# Patient Record
(demographics unavailable — no encounter records)

---

## 2024-12-23 NOTE — HISTORY OF PRESENT ILLNESS
[Born at ___ Wks Gestation] : The patient was born at [unfilled] weeks gestation [C/S] : via  section [[place]] : at [facility] [BW: _____] : weight of [unfilled] [Length: _____] : length of [unfilled] [DW: _____] : Discharge weight was [unfilled] [Hepatitis B Vaccine Given] : Hepatitis B vaccine given [Nirsevimab Given] : Nirsevimab given  [Expressed Breast milk ___oz/feed] : [unfilled] oz of expressed breast milk per feed [Formula ___ oz/feed] : [unfilled] oz of formula per feed [Hours between feeds ___] : Child is fed every [unfilled] hours [Normal] : Normal [Frequency of stools: ___] : Frequency of stools: [unfilled]  stools [Yellow] : yellow [Seedy] : seedy [AMA] : AMA [Other: ____] : [unfilled] [Age: ___] : [unfilled] year old mother [C/S Indication: ____] : ( [unfilled] ) [(1) _____] : [unfilled] [(5) _____] : [unfilled] [None] : There were no delivery complications [MBT: ____] : MBT - [unfilled] [Has a racial, or ethnic risk of G6PD deficiency (, , Mediterranean, or  ancestry)] : Has a racial, or ethnic risk of G6PD deficiency (, , Mediterranean, or  ancestry)  [Requires G6PD quantitative test] : Requires G6PD quantitative test [In Bassinet/Crib] : sleeps in bassinet/crib [On back] : sleeps on back [No] : No cigarette smoke exposure [Rear facing car seat in back seat] : Rear facing car seat in back seat [Carbon Monoxide Detectors] : Carbon monoxide detectors at home [Smoke Detectors] : Smoke detectors at home. [NO] : No [RSV vaccine] : RSV vaccine not received by mother at least 14 days prior to delivery [HepBsAG] : HepBsAg negative [HIV] : HIV negative [GBS] : GBS negative [Rubella (Immune)] : Rubella not immune [VDRL/RPR (Reactive)] : VDRL/RPR nonreactive [] : negative [Presents with hemolytic anemia] : Does not present with hemolytic anemia  [Presents with hemolytic jaundice] : Does not present with hemolytic jaundice  [Presents with early onset increasing  jaundice persisting beyond the first week of life (bilirubin level greater than the 40th percentile] : Does not present with early onset increasing  jaundice persisting beyond the first week of life (bilirubin level greater than the 40th percentile for age in hours)   [Is admitted to the hospital for jaundice following discharge] : Is not admitted to the hospital for jaundice following discharge   [FreeTextEntry9] : A+ [FreeTextEntry8] : PARENTS REPORT NORMAL FETAL ECHO [Co-sleeping] : no co-sleeping [Loose bedding, pillow, toys, and/or bumpers in crib] : no loose bedding, pillow, toys, and/or bumpers in crib [Exposure to electronic nicotine delivery system] : No exposure to electronic nicotine delivery system [FreeTextEntry7] : MOTHER (JULIETA, STAY AT HOME MOM, 39Y) FATHER (JÚNIOR, /SELF EMPLOYEED, 36Y)  [de-identified] : NASAL CONGESTION [de-identified] : SIMILAC TOTAL 360 75%/ BREAST 25% [de-identified] : ADVISED AGAINST CO-SLEEPING WITH TWIN

## 2024-12-23 NOTE — PLAN
[TextEntry] : WILL REACH OUT TO CARDIO GIVEN HARSHNESS OF [facility] TO OBTAIN EVAL IN NEXT FEW DAYS WEIGHT/COLOR CHECK IN 1 WEEK HIP SONO AT 6 WEEKS RX FOR G6PD ([place] PERFORM [place] [facility] [place] 6 WEEKS[place]  ADDENDUM: SPOKE WITH DR. MILLER (CARDIO), HE WILL SEE BABY TOMORROW 12/24 AT 10AM

## 2024-12-23 NOTE — PHYSICAL EXAM
[Alert] : alert [Normocephalic] : normocephalic [Flat Open Anterior [place]] : flat open anterior fontanelle [Icteric sclera] : icteric sclera [PERRL] : PERRL [Red Reflex Bilateral] : red reflex bilateral [Normally Placed Ears] : normally placed ears [Auricles Well Formed] : auricles well formed [Clear Tympanic membranes] : clear tympanic membranes [Light reflex present] : light reflex present [Bony structures visible] : bony structures visible [Patent Auditory Canal] : patent auditory canal [Nares Patent] : nares patent [Palate Intact] : palate intact [Uvula Midline] : uvula midline [Supple, full passive range of motion] : supple, full passive range of motion [Symmetric Chest Rise] : symmetric chest rise [Clear to Auscultation Bilaterally] : clear to auscultation bilaterally [Normoactive Precordium] : no normoactive precordium [Regular Rate and Rhythm] : regular rate and rhythm [S1, S2 present] : S1, S2 present [+2 Femoral Pulses] : +2 femoral pulses [Soft] : soft [Bowel Sounds] : bowel sounds present [Umbilical Stump Dry, Clean, Intact] : umbilical stump dry, clean, intact [Normal external genitalia] : normal external genitalia [Patent Vagina] : patent vagina [Patent] : patent [Normally Placed] : normally placed [No Abnormal Lymph Nodes Palpated] : no abnormal lymph nodes palpated [Symmetric Flexed Extremities] : symmetric flexed extremities [Startle Reflex] : startle reflex present [Suck Reflex] : suck reflex present [Rooting] : rooting reflex present [Palmar Grasp] : palmar grasp present [Plantar Grasp] : plantar reflex present [Symmetric [place]] : symmetric [place] [Jaundice] : jaundice [Acute Distress] : no acute distress [Discharge] : no discharge [Palpable Masses] : no palpable masses [Tender] : nontender [Distended] : not distended [Hepatomegaly] : no hepatomegaly [Splenomegaly] : no splenomegaly [Clitoromegaly] : no clitoromegaly [Hurtado-Ortolani] : negative Hurtado-Ortolani [Spinal Dimple] : no spinal dimple [Tuft of Hair] : no tuft of hair [FreeTextEntry8] : HARSH CONTINUOUS MACHINERY LIKE MURMUR LSB [de-identified] : JAUNDICE TO ABDOMEN

## 2024-12-23 NOTE — HISTORY OF PRESENT ILLNESS
[Born at ___ Wks Gestation] : The patient was born at [unfilled] weeks gestation [C/S] : via  section [[place]] : at [facility] [BW: _____] : weight of [unfilled] [Length: _____] : length of [unfilled] [DW: _____] : Discharge weight was [unfilled] [Hepatitis B Vaccine Given] : Hepatitis B vaccine given [Nirsevimab Given] : Nirsevimab given  [Expressed Breast milk ___oz/feed] : [unfilled] oz of expressed breast milk per feed [Formula ___ oz/feed] : [unfilled] oz of formula per feed [Hours between feeds ___] : Child is fed every [unfilled] hours [Normal] : Normal [Frequency of stools: ___] : Frequency of stools: [unfilled]  stools [Yellow] : yellow [Seedy] : seedy [AMA] : AMA [Other: ____] : [unfilled] [Age: ___] : [unfilled] year old mother [C/S Indication: ____] : ( [unfilled] ) [(1) _____] : [unfilled] [(5) _____] : [unfilled] [None] : There were no delivery complications [MBT: ____] : MBT - [unfilled] [Has a racial, or ethnic risk of G6PD deficiency (, , Mediterranean, or  ancestry)] : Has a racial, or ethnic risk of G6PD deficiency (, , Mediterranean, or  ancestry)  [Requires G6PD quantitative test] : Requires G6PD quantitative test [In Bassinet/Crib] : sleeps in bassinet/crib [On back] : sleeps on back [No] : No cigarette smoke exposure [Rear facing car seat in back seat] : Rear facing car seat in back seat [Carbon Monoxide Detectors] : Carbon monoxide detectors at home [Smoke Detectors] : Smoke detectors at home. [NO] : No [RSV vaccine] : RSV vaccine not received by mother at least 14 days prior to delivery [HepBsAG] : HepBsAg negative [HIV] : HIV negative [GBS] : GBS negative [Rubella (Immune)] : Rubella not immune [VDRL/RPR (Reactive)] : VDRL/RPR nonreactive [] : negative [Presents with hemolytic anemia] : Does not present with hemolytic anemia  [Presents with hemolytic jaundice] : Does not present with hemolytic jaundice  [Presents with early onset increasing  jaundice persisting beyond the first week of life (bilirubin level greater than the 40th percentile] : Does not present with early onset increasing  jaundice persisting beyond the first week of life (bilirubin level greater than the 40th percentile for age in hours)   [Is admitted to the hospital for jaundice following discharge] : Is not admitted to the hospital for jaundice following discharge   [FreeTextEntry9] : A+ [FreeTextEntry8] : PARENTS REPORT NORMAL FETAL ECHO [Co-sleeping] : no co-sleeping [Loose bedding, pillow, toys, and/or bumpers in crib] : no loose bedding, pillow, toys, and/or bumpers in crib [Exposure to electronic nicotine delivery system] : No exposure to electronic nicotine delivery system [FreeTextEntry7] : MOTHER (JULIETA, STAY AT HOME MOM, 39Y) FATHER (JÚNIOR, /SELF EMPLOYEED, 36Y)  [de-identified] : NASAL CONGESTION [de-identified] : SIMILAC TOTAL 360 75%/ BREAST 25% [de-identified] : ADVISED AGAINST CO-SLEEPING WITH TWIN

## 2024-12-23 NOTE — PHYSICAL EXAM
[Alert] : alert [Normocephalic] : normocephalic [Flat Open Anterior [place]] : flat open anterior fontanelle [Icteric sclera] : icteric sclera [PERRL] : PERRL [Red Reflex Bilateral] : red reflex bilateral [Normally Placed Ears] : normally placed ears [Auricles Well Formed] : auricles well formed [Clear Tympanic membranes] : clear tympanic membranes [Light reflex present] : light reflex present [Bony structures visible] : bony structures visible [Patent Auditory Canal] : patent auditory canal [Nares Patent] : nares patent [Palate Intact] : palate intact [Uvula Midline] : uvula midline [Supple, full passive range of motion] : supple, full passive range of motion [Symmetric Chest Rise] : symmetric chest rise [Clear to Auscultation Bilaterally] : clear to auscultation bilaterally [Normoactive Precordium] : no normoactive precordium [Regular Rate and Rhythm] : regular rate and rhythm [S1, S2 present] : S1, S2 present [+2 Femoral Pulses] : +2 femoral pulses [Soft] : soft [Bowel Sounds] : bowel sounds present [Umbilical Stump Dry, Clean, Intact] : umbilical stump dry, clean, intact [Normal external genitalia] : normal external genitalia [Patent Vagina] : patent vagina [Patent] : patent [Normally Placed] : normally placed [No Abnormal Lymph Nodes Palpated] : no abnormal lymph nodes palpated [Symmetric Flexed Extremities] : symmetric flexed extremities [Startle Reflex] : startle reflex present [Suck Reflex] : suck reflex present [Rooting] : rooting reflex present [Palmar Grasp] : palmar grasp present [Plantar Grasp] : plantar reflex present [Symmetric [place]] : symmetric [place] [Jaundice] : jaundice [Acute Distress] : no acute distress [Discharge] : no discharge [Palpable Masses] : no palpable masses [Tender] : nontender [Distended] : not distended [Hepatomegaly] : no hepatomegaly [Splenomegaly] : no splenomegaly [Clitoromegaly] : no clitoromegaly [Hurtado-Ortolani] : negative Hurtado-Ortolani [Spinal Dimple] : no spinal dimple [Tuft of Hair] : no tuft of hair [FreeTextEntry8] : HARSH CONTINUOUS MACHINERY LIKE MURMUR LSB [de-identified] : JAUNDICE TO ABDOMEN

## 2024-12-23 NOTE — PLAN
[TextEntry] : WILL REACH OUT TO CARDIO GIVEN HARSHNESS OF [facility] TO OBTAIN EVAL IN NEXT FEW DAYS WEIGHT/COLOR CHECK IN 1 WEEK HIP SONO AT 6 WEEKS RX FOR G6PD (MAY PERFORM WHEN GO TO [facility] FOR HIP SONO AT 6 WEEKS)  ADDENDUM: SPOKE WITH DR. IMLLER (CARDIO), HE WILL SEE BABY TOMORROW 12/24 AT 10AM

## 2024-12-23 NOTE — DISCUSSION/SUMMARY
[Normal Growth] : growth [Normal Development] : developmental [No Elimination Concerns] : elimination [Continue Regimen] : feeding [No Skin Concerns] : skin [Normal Sleep Pattern] : sleep [None] : no known medical problems [Anticipatory Guidance Given] : Anticipatory guidance addressed as per the history of present illness section [ Transition] :  transition [ Care] :  care [Nutritional Adequacy] : nutritional adequacy [Parental Well-Being] : parental well-being [Safety] : safety [Hepatitis B In Hospital] : Hepatitis B administered while in the hospital [No Vaccines] : no vaccines needed [No Medications] : ~He/She~ is not on any medications [Parent/Guardian] : Parent/Guardian [FreeTextEntry1] :  When in car, patient should be in rear-facing car seat in back seat. Air dry umbillical stump. Put baby to sleep on back, in own crib with no loose or soft bedding. Limit baby's exposure to others, especially those with fever or unknown vaccine status. CALL ASAP FOR RED, WHITE/GREY OR BLACK STOOLS TO ER FOR RECTAL TEMP 100.4 OR MORE AT AGE LESS THAN 60 DOL

## 2024-12-24 NOTE — REVIEW OF SYSTEMS
[Nl] : no feeding issues at this time. [___ Formula] : [unfilled] Formula  [___ ounces/feeding] : ~TEODORA rizvi/feeding [___ Times/day] : [unfilled] times/day [Acting Fussy] : not acting ~L fussy [Fever] : no fever [Wgt Loss (___ Lbs)] : no recent weight loss [Pallor] : not pale [Discharge] : no discharge [Redness] : no redness [Nasal Discharge] : no nasal discharge [Nasal Stuffiness] : no nasal congestion [Stridor] : no stridor [Cyanosis] : no cyanosis [Edema] : no edema [Diaphoresis] : not diaphoretic [Tachypnea] : not tachypneic [Wheezing] : no wheezing [Cough] : no cough [Being A Poor Eater] : not a poor eater [Vomiting] : no vomiting [Diarrhea] : no diarrhea [Decrease In Appetite] : appetite not decreased [Fainting (Syncope)] : no fainting [Dec Consciousness] :  no decrease in consciousness [Seizure] : no seizures [Hypotonicity (Flaccid)] : not hypotonic [Refusal to Bear Wgt] : normal weight bearing [Puffy Hands/Feet] : no hand/feet puffiness [Rash] : no rash [Hemangioma] : no hemangioma [Jaundice] : no jaundice [Wound problems] : no wound problems [Bruising] : no tendency for easy bruising [Swollen Glands] : no lymphadenopathy [Enlarged [place]] : the fontanelle was not enlarged [Hoarse Cry] : no hoarse cry [Failure To Thrive] : no failure to thrive [Vaginal Discharge] : no vaginal discharge [Ambiguous Genitals] : genitals not ambiguous [Dec Urine Output] : no oliguria [Solid Foods] : No solid food at this time

## 2024-12-24 NOTE — PHYSICAL EXAM
[General Appearance - Alert] : alert [General Appearance - In No Acute Distress] : in no acute distress [General Appearance - Well Nourished] : well nourished [General Appearance - Well Developed] : well developed [General Appearance - Well-Appearing] : well appearing [Appearance Of Head] : the head was normocephalic [Facies] : there were no dysmorphic facial features [Sclera] : the conjunctiva were normal [Outer Ear] : the ears and nose were normal in appearance [Examination Of The Oral Cavity] : mucous membranes were moist and pink [Auscultation Breath Sounds / Voice Sounds] : breath sounds clear to auscultation bilaterally [Apical Impulse] : quiet precordium with normal apical impulse [Normal Chest Appearance] : the chest was normal in appearance [Heart Rate And Rhythm] : normal heart rate and rhythm [Heart Sounds] : normal S1 and S2 [Heart Sounds Gallop] : no gallops [Heart Sounds Pericardial Friction Rub] : no pericardial rub [Edema] : no edema [Arterial Pulses] : normal upper and lower extremity pulses with no pulse delay [Heart Sounds Click] : no clicks [Capillary Refill Test] : normal capillary refill [Systolic] : systolic [II] : a grade 2/6 [LUSB] : LUSB [Ejection] : ejection [Harsh] : harsh [Abdomen Soft] : soft [Nondistended] : nondistended [Abdomen Tenderness] : non-tender [Nail Clubbing] : no clubbing  or cyanosis of the fingers [Motor Tone] : normal muscle strength and tone [] : no rash [Skin Lesions] : no lesions [Skin Turgor] : normal turgor [Demonstrated Behavior - Infant Nonreactive To Parents] : interactive [Mood] : mood and affect were appropriate for age [Demonstrated Behavior] : normal behavior

## 2024-12-24 NOTE — HISTORY OF PRESENT ILLNESS
[FreeTextEntry1] : NBA is a 7 day old twin female who presents for cardiac evaluation of a murmur that was heard yesterday. Her pregnancy and  period were unremarkable. NBA has been asymptomatic from a cardiac standpoint and has been growing and developing appropriately without tachypnea, cyanosis, irritability, or diaphoresis with feeds.

## 2024-12-24 NOTE — CONSULT LETTER
[Today's Date] : [unfilled] [Name] : Name: [unfilled] [] : : ~~ [Today's Date:] : [unfilled] [Dear  ___:] : Dear Dr. [unfilled]: [Consult] : I had the pleasure of evaluating your patient, [unfilled]. My full evaluation follows. [Consult - Single Provider] : Thank you very much for allowing me to participate in the care of this patient. If you have any questions, please do not hesitate to contact me. [Sincerely,] : Sincerely, [FreeTextEntry4] : DR. DIPTI WRIGHT MD [de-identified] : Annie Doyle MD, FAAP, FACC  Pediatric Cardiologist  of Pediatrics [facility]

## 2024-12-24 NOTE — PAST MEDICAL HISTORY
[At ___ Weeks Gestation] : at [unfilled] weeks gestation [ Section] : by  section [None] : No maternal complications [FreeTextEntry2] : twin pregnancy fetal echo performed [FreeTextEntry1] : no complications

## 2024-12-24 NOTE — CARDIOLOGY SUMMARY
[Today's Date] : [unfilled] [FreeTextEntry1] : Normal sinus rhythm without preexcitation or ectopy. Heart rate (bpm): 150 [FreeTextEntry2] : 1. Patent foramen ovale versus small secundum ASD, with left to right flow across the interatrial septum. 2. Moderate to large, non-restrictive, membranous ventricular septal defect, with left to right systolic interventricular shunt. 3. 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 4. Trivial aortic valve regurgitation. 5. Acceleration of branch PA flow velocities up to \R\2 m/s secondary to PPS coupled with the significant left to right shunt at the atrial and ventricular levels. 6. Normal left ventricular size, morphology and systolic function. 7. Normal right ventricular morphology with qualitatively normal size and systolic function. 8. No pericardial effusion.

## 2025-01-01 NOTE — PLAN
[TextEntry] : F/U WITH CARDIO IN 2 WEEKS WELL VISIT IN 2-3 WEEKS CALL ASAP FOR SIGNS OF DISTRESS OR POOR FEEDING

## 2025-01-17 NOTE — DISCUSSION/SUMMARY
[FreeTextEntry1] : NBA is doing well from a cardiac standpoint without evidence of CHF related to her VSD.  I explained to her parents that there is growth of aneurysmal tissue causing restriction of flow across the VSD that will hopefully continue to grow and further restrict VSD flow. We again reviewed the signs and symptoms of CHF in neonates and discussed proper feeding methods to ensure proper/ adequate growth. I would like to see NBA for follow-up in ~2 months or sooner if she begins to exhibit signs of CHF including tachypnea, diaphoresis with feeds or poor weight gain. [Needs SBE Prophylaxis] : [unfilled] does not need bacterial endocarditis prophylaxis [May participate in all age-appropriate activities] : [unfilled] May participate in all age-appropriate activities.

## 2025-01-17 NOTE — REVIEW OF SYSTEMS
[Nl] : no feeding issues at this time. [___ Formula] : [unfilled] Formula  [___ ounces/feeding] : ~TEODORA rizvi/feeding [___ Times/day] : [unfilled] times/day [Acting Fussy] : not acting ~L fussy [Fever] : no fever [Wgt Loss (___ Lbs)] : no recent weight loss [Pallor] : not pale [Discharge] : no discharge [Redness] : no redness [Nasal Discharge] : no nasal discharge [Nasal Stuffiness] : no nasal congestion [Stridor] : no stridor [Cyanosis] : no cyanosis [Edema] : no edema [Diaphoresis] : not diaphoretic [Tachypnea] : not tachypneic [Wheezing] : no wheezing [Cough] : no cough [Being A Poor Eater] : not a poor eater [Vomiting] : no vomiting [Diarrhea] : no diarrhea [Decrease In Appetite] : appetite not decreased [Fainting (Syncope)] : no fainting [Dec Consciousness] :  no decrease in consciousness [Seizure] : no seizures [Hypotonicity (Flaccid)] : not hypotonic [Refusal to Bear Wgt] : normal weight bearing [Puffy Hands/Feet] : no hand/feet puffiness [Rash] : no rash [Hemangioma] : no hemangioma [Jaundice] : no jaundice [Wound problems] : no wound problems [Bruising] : no tendency for easy bruising [Swollen Glands] : no lymphadenopathy [Enlarged [place]] : the fontanelle was not enlarged [Hoarse Cry] : no hoarse cry [Failure To Thrive] : no failure to thrive [Vaginal Discharge] : no vaginal discharge [Ambiguous Genitals] : genitals not ambiguous [Dec Urine Output] : no oliguria [Solid Foods] : No solid food at this time

## 2025-01-17 NOTE — CONSULT LETTER
[Today's Date] : [unfilled] [Name] : Name: [unfilled] [] : : ~~ [Today's Date:] : [unfilled] [Dear  ___:] : Dear Dr. [unfilled]: [Consult] : I had the pleasure of evaluating your patient, [unfilled]. My full evaluation follows. [Consult - Single Provider] : Thank you very much for allowing me to participate in the care of this patient. If you have any questions, please do not hesitate to contact me. [Sincerely,] : Sincerely, [FreeTextEntry4] : DR. DIPTI WRIGHT MD [de-identified] : Annie Doyle MD, FAAP, FACC  Pediatric Cardiologist  of Pediatrics [facility]

## 2025-01-17 NOTE — REVIEW OF SYSTEMS
[Nl] : no feeding issues at this time. [___ Formula] : [unfilled] Formula  [___ ounces/feeding] : ~TEODORA rizvi/feeding [___ Times/day] : [unfilled] times/day [Acting Fussy] : not acting ~L fussy [Fever] : no fever [Wgt Loss (___ Lbs)] : no recent weight loss [Pallor] : not pale [Discharge] : no discharge [Redness] : no redness [Nasal Discharge] : no nasal discharge [Nasal Stuffiness] : no nasal congestion [Stridor] : no stridor [Cyanosis] : no cyanosis [Edema] : no edema [Diaphoresis] : not diaphoretic [Tachypnea] : not tachypneic [Wheezing] : no wheezing [Cough] : no cough [Being A Poor Eater] : not a poor eater [Vomiting] : no vomiting [Diarrhea] : no diarrhea [Decrease In Appetite] : appetite not decreased [Fainting (Syncope)] : no fainting [Dec Consciousness] :  no decrease in consciousness [Seizure] : no seizures [Hypotonicity (Flaccid)] : not hypotonic [Refusal to Bear Wgt] : normal weight bearing [Puffy Hands/Feet] : no hand/feet puffiness [Rash] : no rash [Hemangioma] : no hemangioma [Jaundice] : no jaundice [Wound problems] : no wound problems [Bruising] : no tendency for easy bruising [Swollen Glands] : no lymphadenopathy [Enlarged [place]] : the fontanelle was not enlarged [Hoarse Cry] : no hoarse cry [Failure To Thrive] : no failure to thrive [Vaginal Discharge] : no vaginal discharge [Ambiguous Genitals] : genitals not ambiguous [Dec Urine Output] : no oliguria [Solid Foods] : No solid food at this time

## 2025-01-17 NOTE — CONSULT LETTER
[Today's Date] : [unfilled] [Name] : Name: [unfilled] [] : : ~~ [Today's Date:] : [unfilled] [Dear  ___:] : Dear Dr. [unfilled]: [Consult] : I had the pleasure of evaluating your patient, [unfilled]. My full evaluation follows. [Consult - Single Provider] : Thank you very much for allowing me to participate in the care of this patient. If you have any questions, please do not hesitate to contact me. [Sincerely,] : Sincerely, [FreeTextEntry4] : DR. DIPTI WRIGHT MD [de-identified] : Annie Doyle MD, FAAP, FACC  Pediatric Cardiologist  of Pediatrics [facility]

## 2025-01-17 NOTE — CARDIOLOGY SUMMARY
[Today's Date] : [unfilled] [FreeTextEntry1] : Normal sinus rhythm at 159 bpm.   [FreeTextEntry2] : 1. Moderate to large, restrictive (secondary to aneurysmal tissue), perimembranous ventricular septal defect, with left to right systolic interventricular shunt. 2. Normal left ventricular size, morphology and systolic function. 3. Normal right ventricular morphology with qualitatively normal size and systolic function. 4. No pericardial effusion. 5. Compared to the previous echocardiogram of 12/24/2024; there appears to have been interval tissue growth restricting VSD flow.

## 2025-01-17 NOTE — PHYSICAL EXAM
[General Appearance - Alert] : alert [General Appearance - In No Acute Distress] : in no acute distress [General Appearance - Well Nourished] : well nourished [General Appearance - Well Developed] : well developed [General Appearance - Well-Appearing] : well appearing [Appearance Of Head] : the head was normocephalic [Facies] : there were no dysmorphic facial features [Sclera] : the conjunctiva were normal [Outer Ear] : the ears and nose were normal in appearance [Examination Of The Oral Cavity] : mucous membranes were moist and pink [Auscultation Breath Sounds / Voice Sounds] : breath sounds clear to auscultation bilaterally [Normal Chest Appearance] : the chest was normal in appearance [Apical Impulse] : quiet precordium with normal apical impulse [Heart Rate And Rhythm] : normal heart rate and rhythm [Heart Sounds] : normal S1 and S2 [Heart Sounds Gallop] : no gallops [Heart Sounds Pericardial Friction Rub] : no pericardial rub [Edema] : no edema [Arterial Pulses] : normal upper and lower extremity pulses with no pulse delay [Heart Sounds Click] : no clicks [Capillary Refill Test] : normal capillary refill [III] : a grade 3/6   [LUSB] : LUSB [Holosystolic] : holosystolic [Harsh] : harsh [Abdomen Soft] : soft [Nondistended] : nondistended [Abdomen Tenderness] : non-tender [Nail Clubbing] : no clubbing  or cyanosis of the fingers [Motor Tone] : normal muscle strength and tone [] : no rash [Skin Lesions] : no lesions [Skin Turgor] : normal turgor [Demonstrated Behavior - Infant Nonreactive To Parents] : interactive [Mood] : mood and affect were appropriate for age [Demonstrated Behavior] : normal behavior

## 2025-01-17 NOTE — REASON FOR VISIT
- Last BM aspirate (8/13) with no myeloblasts, lymphoblasts, or hematogones  - Access: SLM (deaccessed), DL left femoral Broviac (replaced 8/27) with Ethanol locks  - Transfusion criteria 8/30 [Follow-Up] : a follow-up visit for [Initial Consultation] : an initial consultation for [Murmurs] : a murmur [Mother] : mother [Father] : father [Medical Records] : medical records [FreeTextEntry3] : VSD, PPS and  PFO

## 2025-01-17 NOTE — CONSULT LETTER
[Today's Date] : [unfilled] [Name] : Name: [unfilled] [] : : ~~ [Today's Date:] : [unfilled] [Dear  ___:] : Dear Dr. [unfilled]: [Consult] : I had the pleasure of evaluating your patient, [unfilled]. My full evaluation follows. [Consult - Single Provider] : Thank you very much for allowing me to participate in the care of this patient. If you have any questions, please do not hesitate to contact me. [Sincerely,] : Sincerely, [FreeTextEntry4] : DR. DIPTI WRIGHT MD [de-identified] : Annie Doyle MD, FAAP, FACC  Pediatric Cardiologist  of Pediatrics [facility]

## 2025-01-17 NOTE — REASON FOR VISIT
[Follow-Up] : a follow-up visit for [Initial Consultation] : an initial consultation for [Murmurs] : a murmur [Mother] : mother [Father] : father [Medical Records] : medical records [FreeTextEntry3] : VSD, PPS and  PFO

## 2025-01-17 NOTE — HISTORY OF PRESENT ILLNESS
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 NBA has been doing well from a cardiac standpoint. She has been feeding well without tachypnea, cyanosis, irritability or diaphoresis with feeds.  She has been gaining weight  well (39g/day on average since last visit) without any parental concerns.

## 2025-01-17 NOTE — REVIEW OF SYSTEMS
[Nl] : no feeding issues at this time. [___ Formula] : [unfilled] Formula  [___ ounces/feeding] : ~TEODORA rizvi/feeding [___ Times/day] : [unfilled] times/day [Acting Fussy] : not acting ~L fussy [Fever] : no fever [Wgt Loss (___ Lbs)] : no recent weight loss [Pallor] : not pale [Discharge] : no discharge [Redness] : no redness [Nasal Discharge] : no nasal discharge [Nasal Stuffiness] : no nasal congestion [Stridor] : no stridor [Cyanosis] : no cyanosis [Edema] : no edema [Diaphoresis] : not diaphoretic [Tachypnea] : not tachypneic [Wheezing] : no wheezing [Cough] : no cough [Being A Poor Eater] : not a poor eater [Vomiting] : no vomiting [Diarrhea] : no diarrhea [Decrease In Appetite] : appetite not decreased [Fainting (Syncope)] : no fainting [Dec Consciousness] :  no decrease in consciousness [Seizure] : no seizures [Hypotonicity (Flaccid)] : not hypotonic [Refusal to Bear Wgt] : normal weight bearing [Puffy Hands/Feet] : no hand/feet puffiness [Rash] : no rash [Hemangioma] : no hemangioma [Jaundice] : no jaundice [Wound problems] : no wound problems [Bruising] : no tendency for easy bruising [Swollen Glands] : no lymphadenopathy [Enlarged [place]] : the fontanelle was not enlarged [Hoarse Cry] : no hoarse cry [Failure To Thrive] : no failure to thrive [Vaginal Discharge] : no vaginal discharge [Ambiguous Genitals] : genitals not ambiguous [Dec Urine Output] : no oliguria [Solid Foods] : No solid food at this time

## 2025-01-18 NOTE — PHYSICAL EXAM
[Wheezing] : no wheezing [Crackles] : no crackles [Transmitted Upper Airway Sounds] : no transmitted upper airway sounds [Tachypnea] : no tachypnea [NL] : warm, clear [de-identified] : Acne of cheeks, neck. No pustules

## 2025-01-18 NOTE — HISTORY OF PRESENT ILLNESS
[de-identified] : RASH ON FACE, ON BREAST MILK AND SIMILAC SPITTING UP AFTER FEEDING RECENTLY STARTED ENFAMIL , STARTLES EASILY  [FreeTextEntry6] : 1 month old F twin with VSD presenting for rash on face, also spitting up concerns.   More cranky at night, spit ups at night. She is feeding every hr; taking 1-2oz at a time. If she goes 2 hrs she will take 2oz. Rash on her cheeks, getting worse despite Aquaphor. Not using scented products.

## 2025-01-18 NOTE — DISCUSSION/SUMMARY
[FreeTextEntry1] : 1 month old F twin with VSD, with  acne of face. Increased spitting up; no back arching, and good weight gain.   Plan: - Wet wipes for cheek, decrease Aquaphor - Return if develops pustules of face - Can consider feeding infant 0.5-1oz less per feed - Return for WCC

## 2025-01-18 NOTE — HISTORY OF PRESENT ILLNESS
[de-identified] : RASH ON FACE, ON BREAST MILK AND SIMILAC SPITTING UP AFTER FEEDING RECENTLY STARTED ENFAMIL , STARTLES EASILY  [FreeTextEntry6] : 1 month old F twin with VSD presenting for rash on face, also spitting up concerns.   More cranky at night, spit ups at night. She is feeding every hr; taking 1-2oz at a time. If she goes 2 hrs she will take 2oz. Rash on her cheeks, getting worse despite Aquaphor. Not using scented products.

## 2025-01-18 NOTE — PHYSICAL EXAM
[Wheezing] : no wheezing [Crackles] : no crackles [Transmitted Upper Airway Sounds] : no transmitted upper airway sounds [Tachypnea] : no tachypnea [NL] : warm, clear [de-identified] : Acne of cheeks, neck. No pustules

## 2025-01-24 NOTE — HISTORY OF PRESENT ILLNESS
[de-identified] : RASH [FreeTextEntry6] : started out as acne, now worsening and more dry and bumpy still drinking well, in good spirits good weight gain

## 2025-01-29 NOTE — PHYSICAL EXAM
[Alert] : alert [Normocephalic] : normocephalic [Flat Open Anterior [place]] : flat open anterior fontanelle [PERRL] : PERRL [Red Reflex Bilateral] : red reflex bilateral [Normally Placed Ears] : normally placed ears [Auricles Well Formed] : auricles well formed [Clear Tympanic membranes] : clear tympanic membranes [Light reflex present] : light reflex present [Bony landmarks visible] : bony landmarks visible [Nares Patent] : nares patent [Palate Intact] : palate intact [Uvula Midline] : uvula midline [Supple, full passive range of motion] : supple, full passive range of motion [Symmetric Chest Rise] : symmetric chest rise [Clear to Auscultation Bilaterally] : clear to auscultation bilaterally [Regular Rate and Rhythm] : regular rate and rhythm [S1, S2 present] : S1, S2 present [+2 Femoral Pulses] : +2 femoral pulses [Soft] : soft [Bowel Sounds] : bowel sounds present [Normal external genitailia] : normal external genitalia [Patent Vagina] : vagina patent [Normally Placed] : normally placed [No Abnormal Lymph Nodes Palpated] : no abnormal lymph nodes palpated [Symmetric Flexed Extremities] : symmetric flexed extremities [Startle Reflex] : startle reflex present [Suck Reflex] : suck reflex present [Rooting] : rooting reflex present [Palmar Grasp] : palmar grasp reflex present [Plantar Grasp] : plantar grasp reflex present [Symmetric Serena] : symmetric [place] [Acute Distress] : no acute distress [Discharge] : no discharge [Palpable Masses] : no palpable masses [Tender] : nontender [Distended] : not distended [Hepatomegaly] : no hepatomegaly [Splenomegaly] : no splenomegaly [Clitoromegaly] : no clitoromegaly [Hurtado-Ortolani] : negative Hurtado-Ortolani [Spinal Dimple] : no spinal dimple [Tuft of Hair] : no tuft of hair [Jaundice] : no jaundice [Rash and/or lesion present] : no rash/lesion [FreeTextEntry4] : CRUSTED YELLOW DISCHARGE IN NARES [FreeTextEntry1] : GROSSLY LARGE HEAD [FreeTextEntry8] : HARSH SYSTOLIC MURMUR

## 2025-01-29 NOTE — PHYSICAL EXAM
[Alert] : alert [Normocephalic] : normocephalic [Flat Open Anterior [place]] : flat open anterior fontanelle [PERRL] : PERRL [Red Reflex Bilateral] : red reflex bilateral [Normally Placed Ears] : normally placed ears [Auricles Well Formed] : auricles well formed [Clear Tympanic membranes] : clear tympanic membranes [Light reflex present] : light reflex present [Bony landmarks visible] : bony landmarks visible [Nares Patent] : nares patent [Palate Intact] : palate intact [Uvula Midline] : uvula midline [Supple, full passive range of motion] : supple, full passive range of motion [Symmetric Chest Rise] : symmetric chest rise [Clear to Auscultation Bilaterally] : clear to auscultation bilaterally [Regular Rate and Rhythm] : regular rate and rhythm [S1, S2 present] : S1, S2 present [+2 Femoral Pulses] : +2 femoral pulses [Soft] : soft [Bowel Sounds] : bowel sounds present [Normal external genitailia] : normal external genitalia [Patent Vagina] : vagina patent [Normally Placed] : normally placed [No Abnormal Lymph Nodes Palpated] : no abnormal lymph nodes palpated [Symmetric Flexed Extremities] : symmetric flexed extremities [Startle Reflex] : startle reflex present [Suck Reflex] : suck reflex present [Rooting] : rooting reflex present [Palmar Grasp] : palmar grasp reflex present [Plantar Grasp] : plantar grasp reflex present [Symmetric Serena] : symmetric [place] [Acute Distress] : no acute distress [Discharge] : no discharge [Palpable Masses] : no palpable masses [Tender] : nontender [Distended] : not distended [Hepatomegaly] : no hepatomegaly [Splenomegaly] : no splenomegaly [Clitoromegaly] : no clitoromegaly [Hurtado-Ortolani] : negative Hurtado-Ortolani [Spinal Dimple] : no spinal dimple [Tuft of Hair] : no tuft of hair [Jaundice] : no jaundice [Rash and/or lesion present] : no rash/lesion [FreeTextEntry4] : CRUSTED YELLOW DISCHARGE IN NARES [FreeTextEntry1] : GROSSLY LARGE HEAD [FreeTextEntry8] : HARSH SYSTOLIC MURMUR

## 2025-01-29 NOTE — HISTORY OF PRESENT ILLNESS
[Mother] : mother [Father] : father [Breast milk] : breast milk [Formula ___ oz/feed] : [unfilled] oz of formula per feed [Hours between feeds ___] : Child is fed every [unfilled] hours [Normal] : Normal [Yellow] : yellow [In Bassinet/Crib] : sleeps in bassinet/crib [On back] : sleeps on back [Loose bedding, pillow, toys, and/or bumpers in crib] : loose bedding, pillow, toys, and/or bumpers in crib [No] : No cigarette smoke exposure [Carbon Monoxide Detectors] : Carbon monoxide detectors at home [Smoke Detectors] : Smoke detectors at home. [Co-sleeping] : no co-sleeping [Pacifier use] : not using pacifier [Exposure to electronic nicotine delivery system] : No exposure to electronic nicotine delivery system [FreeTextEntry7] : RECENTLY TREATED FOR  CEPHALIC PUSTULOSIS, CURRENTLY USING KETOCONAZOLE, MUCH BETTER [de-identified] : VSD SHOWS SOME GROWTH OF ANEURYSMAL TISSUE, FOLLOW UP IN MARCH.  HIP SONO WAS DONE THIS AM FOR BREECH, NORMAL [de-identified] : 75% ENFAMIL, 25% BREAST, NO CYANOSIS OR TIRING WITH FEEDS [FreeTextEntry3] : 2HRS CONSECUTIVELY, WAKES EASILY

## 2025-01-29 NOTE — PLAN
[TextEntry] : SUPPORTIVE CARE F/U RVP/COVID-19 PCR QUARANTINE PENDING COVID PCR RESULTS F/U FOR NEW OR ACUTE WORSENING SYMPTOMS  HEAD SONO DUE TO RAPID INCREASE IN HC CARDIO FOLLOW UP WELL VISIT AT 2 MONTHS

## 2025-01-29 NOTE — DEVELOPMENTAL MILESTONES
[Normal Development] : Normal Development [Calms when picked up or spoken to] : calms when picked up or spoken to [Looks briefly at objects] : looks briefly at objects [Alerts to unexpected sound] : alerts to unexpected sound [FreeTextEntry1] : SOCIAL SMILE, TRACKING

## 2025-01-29 NOTE — HISTORY OF PRESENT ILLNESS
[Mother] : mother [Father] : father [Breast milk] : breast milk [Formula ___ oz/feed] : [unfilled] oz of formula per feed [Hours between feeds ___] : Child is fed every [unfilled] hours [Normal] : Normal [Yellow] : yellow [In Bassinet/Crib] : sleeps in bassinet/crib [On back] : sleeps on back [Loose bedding, pillow, toys, and/or bumpers in crib] : loose bedding, pillow, toys, and/or bumpers in crib [No] : No cigarette smoke exposure [Carbon Monoxide Detectors] : Carbon monoxide detectors at home [Smoke Detectors] : Smoke detectors at home. [Co-sleeping] : no co-sleeping [Pacifier use] : not using pacifier [Exposure to electronic nicotine delivery system] : No exposure to electronic nicotine delivery system [FreeTextEntry7] : RECENTLY TREATED FOR  CEPHALIC PUSTULOSIS, CURRENTLY USING KETOCONAZOLE, MUCH BETTER [de-identified] : VSD SHOWS SOME GROWTH OF ANEURYSMAL TISSUE, FOLLOW UP IN MARCH.  HIP SONO WAS DONE THIS AM FOR BREECH, NORMAL [de-identified] : 75% ENFAMIL, 25% BREAST, NO CYANOSIS OR TIRING WITH FEEDS [FreeTextEntry3] : 2HRS CONSECUTIVELY, WAKES EASILY

## 2025-01-29 NOTE — DISCUSSION/SUMMARY
[Normal Growth] : growth [Normal Development] : development  [No Elimination Concerns] : elimination [Continue Regimen] : feeding [No Skin Concerns] : skin [Normal Sleep Pattern] : sleep [None] : no medical problems [Anticipatory Guidance Given] : Anticipatory guidance addressed as per the history of present illness section [Parental Well-Being] : parental well-being [Family Adjustment] : family adjustment [Feeding Routines] : feeding routines [Infant Adjustment] : infant adjustment [Safety] : safety [Age Approp Vaccines] : Age appropriate vaccines administered [No Medications] : ~He/She~ is not on any medications [Parent/Guardian] : Parent/Guardian [] : The components of the vaccine(s) to be administered today are listed in the plan of care. The disease(s) for which the vaccine(s) are intended to prevent and the risks have been discussed with the caretaker.  The risks are also included in the appropriate vaccination information statements which have been provided to the patient's caregiver.  The caregiver has given consent to vaccinate. [FreeTextEntry1] :  When in car, patient should be in rear-facing car seat in back seat. Put baby to sleep on back, in own crib with no loose or soft bedding. Help baby to develop sleep and feeding routines. May offer pacifier if needed. Start tummy time when awake. Limit baby's exposure to others, especially those with fever or unknown vaccine status. Parents counseled to call if rectal temperature >100.4 degrees F.  Vaccine(s) given today: HEPATITIS B  The potential side effects of today's vaccine(s) and the risks of disease(s) which they are intended to prevent have been discussed with the caretaker.  The caretaker has given consent to vaccinate.

## 2025-02-26 NOTE — DEVELOPMENTAL MILESTONES
[Normal Development] : Normal Development [Smiles responsively] : smiles responsively [Vocalizes with simple cooing] : vocalizes with simple cooing [Lifts head and chest in prone] : lifts head and chest in prone [Opens and shuts hands] : opens and shuts hands [FreeTextEntry1] : TRACKING

## 2025-02-26 NOTE — HISTORY OF PRESENT ILLNESS
[Parents] : parents [Breast milk] : breast milk [Formula ___ oz/feed] : [unfilled] oz of formula per feed [Hours between feeds ___] : Child is fed every [unfilled] hours [Normal] : Normal [Yellow] : yellow [Seedy] : seedy [In Bassinet/Crib] : sleeps in bassinet/crib [No] : No cigarette smoke exposure [Rear facing car seat in back seat] : Rear facing car seat in back seat [Carbon Monoxide Detectors] : Carbon monoxide detectors at home [Smoke Detectors] : Smoke detectors at home. [Pacifier use] : not using pacifier [Exposure to electronic nicotine delivery system] : No exposure to electronic nicotine delivery system [FreeTextEntry7] : VSD FOLLOWED BY CARDIO, NEXT F/U 3/18.  HEAD SONO FOR RAPID RISE IN HC WAS BENIGN [de-identified] : FOR PAST FEW WEEKS WILL CRY DURING FEEDS, PUSH BOTTLE AWAY, SOMETIMES SPIT UP A LITTLE.  WILL TAKE 3OZ BUT HAVE TO GIVE IT OVER A LONGER PERIOD.  EASIER TO FEED WHILE SLEEPING.  NO CYANOSIS, NO SWEATING WITH FEEDS [de-identified] : NANCYL [FreeTextEntry3] : 3HRS CONSECUTIVELY [FreeTextEntry9] : home

## 2025-02-26 NOTE — PHYSICAL EXAM
[Alert] : alert [Normocephalic] : normocephalic [Flat Open Anterior [place]] : flat open anterior fontanelle [PERRL] : PERRL [Red Reflex Bilateral] : red reflex bilateral [Normally Placed Ears] : normally placed ears [Auricles Well Formed] : auricles well formed [Clear Tympanic membranes] : clear tympanic membranes [Light reflex present] : light reflex present [Bony landmarks visible] : bony landmarks visible [Nares Patent] : nares patent [Palate Intact] : palate intact [Uvula Midline] : uvula midline [Supple, full passive range of motion] : supple, full passive range of motion [Symmetric Chest Rise] : symmetric chest rise [Clear to Auscultation Bilaterally] : clear to auscultation bilaterally [Regular Rate and Rhythm] : regular rate and rhythm [S1, S2 present] : S1, S2 present [Murmurs] : murmurs [+2 Femoral Pulses] : +2 femoral pulses [Soft] : soft [Bowel Sounds] : bowel sounds present [Normal external genitailia] : normal external genitalia [Patent Vagina] : vagina patent [Normally Placed] : normally placed [No Abnormal Lymph Nodes Palpated] : no abnormal lymph nodes palpated [Symmetric Flexed Extremities] : symmetric flexed extremities [Startle Reflex] : startle reflex present [Suck Reflex] : suck reflex present [Rooting] : rooting reflex present [Palmar Grasp] : palmar grasp reflex present [Plantar Grasp] : plantar grasp reflex present [Symmetric Serena] : symmetric [place] [Acute Distress] : no acute distress [Discharge] : no discharge [Palpable Masses] : no palpable masses [Tender] : nontender [Distended] : not distended [Hepatomegaly] : no hepatomegaly [Splenomegaly] : no splenomegaly [Clitoromegaly] : no clitoromegaly [Hurtado-Ortolani] : negative Hurtado-Ortolani [Spinal Dimple] : no spinal dimple [Tuft of Hair] : no tuft of hair [Rash and/or lesion present] : no rash/lesion

## 2025-02-26 NOTE — HISTORY OF PRESENT ILLNESS
[Parents] : parents [Breast milk] : breast milk [Formula ___ oz/feed] : [unfilled] oz of formula per feed [Hours between feeds ___] : Child is fed every [unfilled] hours [Normal] : Normal [Yellow] : yellow [Seedy] : seedy [In Bassinet/Crib] : sleeps in bassinet/crib [No] : No cigarette smoke exposure [Rear facing car seat in back seat] : Rear facing car seat in back seat [Carbon Monoxide Detectors] : Carbon monoxide detectors at home [Smoke Detectors] : Smoke detectors at home. [Pacifier use] : not using pacifier [Exposure to electronic nicotine delivery system] : No exposure to electronic nicotine delivery system [FreeTextEntry7] : VSD FOLLOWED BY CARDIO, NEXT F/U 3/18.  HEAD SONO FOR RAPID RISE IN HC WAS BENIGN [de-identified] : FOR PAST FEW WEEKS WILL CRY DURING FEEDS, PUSH BOTTLE AWAY, SOMETIMES SPIT UP A LITTLE.  WILL TAKE 3OZ BUT HAVE TO GIVE IT OVER A LONGER PERIOD.  EASIER TO FEED WHILE SLEEPING.  NO CYANOSIS, NO SWEATING WITH FEEDS [de-identified] : NANCYL [FreeTextEntry3] : 3HRS CONSECUTIVELY [FreeTextEntry9] : home

## 2025-02-26 NOTE — DISCUSSION/SUMMARY
[Normal Growth] : growth [Normal Development] : development  [No Elimination Concerns] : elimination [Continue Regimen] : feeding [No Skin Concerns] : skin [Normal Sleep Pattern] : sleep [None] : no medical problems [Anticipatory Guidance Given] : Anticipatory guidance addressed as per the history of present illness section [Parental (Maternal) Well-Being] : parental (maternal) well-being [Infant-Family Synchrony] : infant-family synchrony [Nutritional Adequacy] : nutritional adequacy [Infant Behavior] : infant behavior [Safety] : safety [Age Approp Vaccines] : Age appropriate vaccines administered [No Medications] : ~He/She~ is not on any medications [Parent/Guardian] : Parent/Guardian [] : The components of the vaccine(s) to be administered today are listed in the plan of care. The disease(s) for which the vaccine(s) are intended to prevent and the risks have been discussed with the caretaker.  The risks are also included in the appropriate vaccination information statements which have been provided to the patient's caregiver.  The caregiver has given consent to vaccinate. [FreeTextEntry1] : When in car, patient should be in rear-facing car seat in back seat. Put baby to sleep on back, in own crib with no loose or soft bedding. Help baby to maintain sleep and feeding routines. May offer pacifier if needed. Continue tummy time when awake. Parents counseled to call if rectal temperature >100.4 degrees F.  Vaccine(s) given today: PENTACEL, PREVNAR AND ROTA  The potential side effects of today's vaccine(s) and the risks of disease(s) which they are intended to prevent have been discussed with the caretaker.  The caretaker has given consent to vaccinate.

## 2025-02-26 NOTE — PHYSICAL EXAM
[Alert] : alert [Normocephalic] : normocephalic [Flat Open Anterior [place]] : flat open anterior fontanelle [PERRL] : PERRL [Red Reflex Bilateral] : red reflex bilateral [Normally Placed Ears] : normally placed ears [Auricles Well Formed] : auricles well formed [Clear Tympanic membranes] : clear tympanic membranes [Light reflex present] : light reflex present [Bony landmarks visible] : bony landmarks visible [Nares Patent] : nares patent [Palate Intact] : palate intact [Uvula Midline] : uvula midline [Supple, full passive range of motion] : supple, full passive range of motion [Symmetric Chest Rise] : symmetric chest rise [Clear to Auscultation Bilaterally] : clear to auscultation bilaterally [Regular Rate and Rhythm] : regular rate and rhythm [S1, S2 present] : S1, S2 present [Murmurs] : murmurs [+2 Femoral Pulses] : +2 femoral pulses [Soft] : soft [Bowel Sounds] : bowel sounds present [Normal external genitailia] : normal external genitalia [Patent Vagina] : vagina patent [Normally Placed] : normally placed [No Abnormal Lymph Nodes Palpated] : no abnormal lymph nodes palpated [Symmetric Flexed Extremities] : symmetric flexed extremities [Startle Reflex] : startle reflex present [Suck Reflex] : suck reflex present [Rooting] : rooting reflex present [Palmar Grasp] : palmar grasp reflex present [Plantar Grasp] : plantar grasp reflex present [Symmetric [place]rena] : symmetric [place] [Acute Distress] : no acute distress [Discharge] : no discharge [Palpable Masses] : no palpable masses [Tender] : nontender [Distended] : not distended [Hepatomegaly] : no hepatomegaly [Splenomegaly] : no splenomegaly [Clitoromegaly] : no clitoromegaly [Hurtado-Ortolani] : negative Hurtado-Ortolani [Spinal Dimple] : no spinal dimple [Tuft of Hair] : no tuft of hair [Rash and/or lesion present] : no rash/lesion

## 2025-03-06 NOTE — DISCUSSION/SUMMARY
[FreeTextEntry1] : 2m F present with parents concerned for decreased appetite since last night. Baby well appearing, gaining weight and with normal exam.   Plan: 1. Continue to encourage baby to consume formula/breastmilk 2. Monitor and return with any new, worsening or persisting symptoms.

## 2025-03-06 NOTE — HISTORY OF PRESENT ILLNESS
[de-identified] : EATING LESS THAN NORMAL, MOM STATES THAT THE PATIENT USUALLY TAKES 2 OZ OF ENFAMIL A.R., BUT IS NOW ONLY TAKING 1 OZ, THE PATIENT IS ALSO TAKING BREASTMILK.  [FreeTextEntry6] : 2m F present with parents concerned for decreased appetite since last night. Child consumed 1oz formula today in addition to breastmilk. Normally child would consume 3oz formula in addition to breastmilk. Child making normal wet diapers. Appears happy and laughing. Denies any fever, cough or congestion. Currently on Enfamil AR due to child spitting up, was doing well on this formula until now.

## 2025-03-18 NOTE — PHYSICAL EXAM
[General Appearance - Alert] : alert [General Appearance - In No Acute Distress] : in no acute distress [General Appearance - Well Nourished] : well nourished [General Appearance - Well Developed] : well developed [General Appearance - Well-Appearing] : well appearing [Appearance Of Head] : the head was normocephalic [Facies] : there were no dysmorphic facial features [Sclera] : the conjunctiva were normal [Outer Ear] : the ears and nose were normal in appearance [Examination Of The Oral Cavity] : mucous membranes were moist and pink [Auscultation Breath Sounds / Voice Sounds] : breath sounds clear to auscultation bilaterally [Normal Chest Appearance] : the chest was normal in appearance [Apical Impulse] : quiet precordium with normal apical impulse [Heart Rate And Rhythm] : normal heart rate and rhythm [Heart Sounds] : normal S1 and S2 [Heart Sounds Gallop] : no gallops [Heart Sounds Pericardial Friction Rub] : no pericardial rub [Edema] : no edema [Arterial Pulses] : normal upper and lower extremity pulses with no pulse delay [Heart Sounds Click] : no clicks [Capillary Refill Test] : normal capillary refill [III] : a grade 3/6   [LMSB] : LMSB  [Holosystolic] : holosystolic [Harsh] : harsh [Abdomen Soft] : soft [Nondistended] : nondistended [Abdomen Tenderness] : non-tender [Nail Clubbing] : no clubbing  or cyanosis of the fingers [Motor Tone] : normal muscle strength and tone [] : no rash [Skin Lesions] : no lesions [Skin Turgor] : normal turgor [Demonstrated Behavior - Infant Nonreactive To Parents] : interactive [Mood] : mood and affect were appropriate for age [Demonstrated Behavior] : normal behavior

## 2025-03-19 NOTE — CARDIOLOGY SUMMARY
[Today's Date] : [unfilled] [FreeTextEntry1] : Normal sinus rhythm without preexcitation or ectopy. Heart rate (bpm): 135 [FreeTextEntry2] : 1. Small to moderate, restrictive (secondary to aneurysmal tissue), perimembranous ventricular septal defect, with left to right systolic interventricular shunt. 2. Patent foramen ovale with left to right shunt, normal variant. 3. Normal left ventricular size, morphology and systolic function. 4. Normal right ventricular morphology with qualitatively normal size and systolic function. 5. No pericardial effusion.

## 2025-03-19 NOTE — CONSULT LETTER
[Today's Date] : [unfilled] [Name] : Name: [unfilled] [] : : ~~ [Today's Date:] : [unfilled] [Dear  ___:] : Dear Dr. [unfilled]: [Consult] : I had the pleasure of evaluating your patient, [unfilled]. My full evaluation follows. [Consult - Single Provider] : Thank you very much for allowing me to participate in the care of this patient. If you have any questions, please do not hesitate to contact me. [Sincerely,] : Sincerely, [FreeTextEntry4] : DR. DIPTI WRIGHT MD [de-identified] : Annie Doyle MD, FAAP, FACC  Pediatric Cardiologist  of Pediatrics [facility]

## 2025-03-19 NOTE — REVIEW OF SYSTEMS
[Nl] : no feeding issues at this time. [___ Formula] : [unfilled] Formula  [___ ounces/feeding] : ~TEODORA rizvi/feeding [___ Times/day] : [unfilled] times/day [Acting Fussy] : not acting ~L fussy [Fever] : no fever [Wgt Loss (___ Lbs)] : no recent weight loss [Pallor] : not pale [Discharge] : no discharge [Redness] : no redness [Nasal Discharge] : no nasal discharge [Nasal Stuffiness] : no nasal congestion [Stridor] : no stridor [Cyanosis] : no cyanosis [Edema] : no edema [Diaphoresis] : not diaphoretic [Tachypnea] : not tachypneic [Wheezing] : no wheezing [Cough] : no cough [Being A Poor Eater] : not a poor eater [Vomiting] : no vomiting [Diarrhea] : no diarrhea [Decrease In Appetite] : appetite not decreased [Fainting (Syncope)] : no fainting [Dec Consciousness] :  no decrease in consciousness [Seizure] : no seizures [Hypotonicity (Flaccid)] : not hypotonic [Refusal to Bear Wgt] : normal weight bearing [Puffy Hands/Feet] : no hand/feet puffiness [Rash] : no rash [Hemangioma] : no hemangioma [Jaundice] : no jaundice [Wound problems] : no wound problems [Bruising] : no tendency for easy bruising [Swollen Glands] : no lymphadenopathy [Enlarged [place]] : the fontanelle was not enlarged [Hoarse Cry] : no hoarse cry [Failure To Thrive] : no failure to thrive [Vaginal Discharge] : no vaginal discharge [Ambiguous Genitals] : genitals not ambiguous [Dec Urine Output] : no oliguria [Solid Foods] : No solid food at this time

## 2025-03-19 NOTE — CONSULT LETTER
[Today's Date] : [unfilled] [Name] : Name: [unfilled] [] : : ~~ [Today's Date:] : [unfilled] [Dear  ___:] : Dear Dr. [unfilled]: [Consult] : I had the pleasure of evaluating your patient, [unfilled]. My full evaluation follows. [Consult - Single Provider] : Thank you very much for allowing me to participate in the care of this patient. If you have any questions, please do not hesitate to contact me. [Sincerely,] : Sincerely, [FreeTextEntry4] : DR. DIPTI WRIGHT MD [de-identified] : Annie Doyle MD, FAAP, FACC  Pediatric Cardiologist  of Pediatrics [facility]

## 2025-03-19 NOTE — REVIEW OF SYSTEMS
[Nl] : no feeding issues at this time. [___ Formula] : [unfilled] Formula  [___ ounces/feeding] : ~TEODORA rizvi/feeding [___ Times/day] : [unfilled] times/day [Acting Fussy] : not acting ~L fussy [Fever] : no fever [Wgt Loss (___ Lbs)] : no recent weight loss [Pallor] : not pale [Discharge] : no discharge [Redness] : no redness [Nasal Discharge] : no nasal discharge [Nasal Stuffiness] : no nasal congestion [Stridor] : no stridor [Cyanosis] : no cyanosis [Edema] : no edema [Diaphoresis] : not diaphoretic [Tachypnea] : not tachypneic [Wheezing] : no wheezing [Cough] : no cough [Being A Poor Eater] : not a poor eater [Vomiting] : no vomiting [Diarrhea] : no diarrhea [Decrease In Appetite] : appetite not decreased [Fainting (Syncope)] : no fainting [Dec Consciousness] :  no decrease in consciousness [Seizure] : no seizures [Hypotonicity (Flaccid)] : not hypotonic [Refusal to Bear Wgt] : normal weight bearing [Puffy Hands/Feet] : no hand/feet puffiness [Rash] : no rash [Hemangioma] : no hemangioma [Jaundice] : no jaundice [Wound problems] : no wound problems [Bruising] : no tendency for easy bruising [Swollen Glands] : no lymphadenopathy [Enlarged [place]] : the fontanelle was not enlarged [Hoarse Cry] : no hoarse cry [Failure To Thrive] : no failure to thrive [Vaginal Discharge] : no vaginal discharge [Ambiguous Genitals] : genitals not ambiguous [Dec Urine Output] : no oliguria [Solid Foods] : No solid food at this time

## 2025-03-19 NOTE — REASON FOR VISIT
[Follow-Up] : a follow-up visit for [Initial Consultation] : an initial consultation for [Murmurs] : a murmur [FreeTextEntry3] : VSD, PPS and  PFO

## 2025-03-19 NOTE — HISTORY OF PRESENT ILLNESS
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 NBA has been doing well from a cardiac standpoint. She has been feeding well without tachypnea, cyanosis, irritability or diaphoresis with feeds.  She has been gaining weight  well without any parental concerns. Of note, her last evaluation showed aneurysmal tissue formation that was leading to restriction of blood flow across the VSD.

## 2025-03-19 NOTE — DISCUSSION/SUMMARY
[May participate in all age-appropriate activities] : [unfilled] May participate in all age-appropriate activities. [FreeTextEntry1] : NBA is doing well from a cardiac standpoint without evidence of CHF related to her VSD.  I explained to her parents that there continues to be growth of aneurysmal tissue causing restriction of flow across the VSD that will hopefully continue to grow and further restrict VSD flow. Her echocardiogram reveals normal left heart dimension and I reassured NBA's parents that it is my hope that she will not need an intervention in the future. I would like to see NBA for follow-up in ~6 months for follow-up or sooner if there are any concerns. [Needs SBE Prophylaxis] : [unfilled] does not need bacterial endocarditis prophylaxis

## 2025-03-19 NOTE — CONSULT LETTER
[Today's Date] : [unfilled] [Name] : Name: [unfilled] [] : : ~~ [Today's Date:] : [unfilled] [Dear  ___:] : Dear Dr. [unfilled]: [Consult] : I had the pleasure of evaluating your patient, [unfilled]. My full evaluation follows. [Consult - Single Provider] : Thank you very much for allowing me to participate in the care of this patient. If you have any questions, please do not hesitate to contact me. [Sincerely,] : Sincerely, [FreeTextEntry4] : DR. DIPTI WRIGHT MD [de-identified] : Annie Doyle MD, FAAP, FACC  Pediatric Cardiologist  of Pediatrics [facility]

## 2025-03-19 NOTE — REVIEW OF SYSTEMS
[Nl] : no feeding issues at this time. [___ Formula] : [unfilled] Formula  [___ ounces/feeding] : ~TEODORA rizvi/feeding [___ Times/day] : [unfilled] times/day [Acting Fussy] : not acting ~L fussy [Fever] : no fever [Wgt Loss (___ Lbs)] : no recent weight loss [Pallor] : not pale [Discharge] : no discharge [Redness] : no redness [Nasal Discharge] : no nasal discharge [Nasal Stuffiness] : no nasal congestion [Stridor] : no stridor [Cyanosis] : no cyanosis [Edema] : no edema [Diaphoresis] : not diaphoretic [Tachypnea] : not tachypneic [Wheezing] : no wheezing [Cough] : no cough [Being A Poor Eater] : not a poor eater [Vomiting] : no vomiting [Diarrhea] : no diarrhea [Decrease In Appetite] : appetite not decreased [Fainting (Syncope)] : no fainting [Dec Consciousness] :  no decrease in consciousness [Seizure] : no seizures [Hypotonicity (Flaccid)] : not hypotonic [Refusal to Bear Wgt] : normal weight bearing [Puffy Hands/Feet] : no hand/feet puffiness [Rash] : no rash [Hemangioma] : no hemangioma [Jaundice] : no jaundice [Wound problems] : no wound problems [Bruising] : no tendency for easy bruising [Swollen Glands] : no lymphadenopathy [Enlarged [place]] : the fontanelle was not enlarged [Hoarse Cry] : no hoarse cry [Failure To Thrive] : no failure to thrive [Vaginal Discharge] : no vaginal discharge [Ambiguous Genitals] : genitals not ambiguous [Dec Urine Output] : no oliguria [Solid Foods] : No solid food at this time

## 2025-04-30 NOTE — PHYSICAL EXAM
[Alert] : alert [Normocephalic] : normocephalic [Flat Open Anterior [place]] : flat open anterior fontanelle [Red Reflex] : red reflex bilateral [PERRL] : PERRL [Normally Placed Ears] : normally placed ears [Auricles Well Formed] : auricles well formed [Clear Tympanic membranes] : clear tympanic membranes [Light reflex present] : light reflex present [Bony landmarks visible] : bony landmarks visible [Nares Patent] : nares patent [Palate Intact] : palate intact [Uvula Midline] : uvula midline [Symmetric Chest Rise] : symmetric chest rise [Clear to Auscultation Bilaterally] : clear to auscultation bilaterally [Regular Rate and Rhythm] : regular rate and rhythm [S1, S2 present] : S1, S2 present [Murmurs] : murmurs [+2 Femoral Pulses] : (+) 2 femoral pulses [Soft] : soft [Bowel Sounds] : bowel sounds present [Normal External Genitalia] : normal external genitalia [Normal Vaginal Introitus] : normal vaginal introitus [Patent] : patent [Normally Placed] : normally placed [No Abnormal Lymph Nodes Palpated] : no abnormal lymph nodes palpated [Plantar Grasp] : plantar grasp reflex present [Startle Reflex] : startle reflex present [Symmetric Serena] : symmetric serena [Acute Distress] : no acute distress [Discharge] : no discharge [Palpable Masses] : no palpable masses [Tender] : nontender [Distended] : nondistended [Hepatomegaly] : no hepatomegaly [Splenomegaly] : no splenomegaly [Clitoromegaly] : no clitoromegaly [Hurtado-Ortolani] : negative Hurtado-Ortolani [Allis Sign] : negative Allis sign [Spinal Dimple] : no spinal dimple [Tuft of Hair] : no tuft of hair [Rash or Lesions] : no rash/lesions

## 2025-04-30 NOTE — PHYSICAL EXAM
[Alert] : alert [Normocephalic] : normocephalic [Flat Open Anterior [place]] : flat open anterior fontanelle [Red Reflex] : red reflex bilateral [PERRL] : PERRL [Normally Placed Ears] : normally placed ears [Auricles Well Formed] : auricles well formed [Clear Tympanic membranes] : clear tympanic membranes [Light reflex present] : light reflex present [Bony landmarks visible] : bony landmarks visible [Nares Patent] : nares patent [Palate Intact] : palate intact [Uvula Midline] : uvula midline [Symmetric Chest Rise] : symmetric chest rise [Clear to Auscultation Bilaterally] : clear to auscultation bilaterally [Regular Rate and Rhythm] : regular rate and rhythm [S1, S2 present] : S1, S2 present [Murmurs] : murmurs [+2 Femoral Pulses] : (+) 2 femoral pulses [Soft] : soft [Bowel Sounds] : bowel sounds present [Normal External Genitalia] : normal external genitalia [Normal Vaginal Introitus] : normal vaginal introitus [Patent] : patent [Normally Placed] : normally placed [No Abnormal Lymph Nodes Palpated] : no abnormal lymph nodes palpated [Startle Reflex] : startle reflex present [Plantar Grasp] : plantar grasp reflex present [Symmetric Serena] : symmetric serena [Acute Distress] : no acute distress [Discharge] : no discharge [Palpable Masses] : no palpable masses [Tender] : nontender [Distended] : nondistended [Hepatomegaly] : no hepatomegaly [Splenomegaly] : no splenomegaly [Clitoromegaly] : no clitoromegaly [Hurtado-Ortolani] : negative Hurtado-Ortolani [Allis Sign] : negative Allis sign [Spinal Dimple] : no spinal dimple [Tuft of Hair] : no tuft of hair [Rash or Lesions] : no rash/lesions

## 2025-04-30 NOTE — HISTORY OF PRESENT ILLNESS
[Mother] : mother [Father] : father [Breast milk] : breast milk [Formula ___ oz/feed] : [unfilled] oz of formula per feed [Hours between feeds ___] : Child is fed every [unfilled] hours [Normal] : Normal [In Bassinet/Crib] : sleeps in bassinet/crib [On back] : sleeps on back [Pacifier use] : Pacifier use [Tummy time] : tummy time [No] : No cigarette smoke exposure [Rear facing car seat in back seat] : Rear facing car seat in back seat [Carbon Monoxide Detectors] : Carbon monoxide detectors at home [Smoke Detectors] : Smoke detectors at home. [Co-sleeping] : no co-sleeping [Exposure to electronic nicotine delivery system] : No exposure to electronic nicotine delivery system [de-identified] : ENFAMIL AR, MOSTLY FORMULA, SOME BREAST [FreeTextEntry3] : 3HRS CONSECUTIVELY AT NIGHT, WAKES TO FEED [de-identified] : OCCASIONAL

## 2025-04-30 NOTE — DEVELOPMENTAL MILESTONES
[Normal Development] : Normal Development [Laughs aloud] : laughs aloud [Turns to voice] : turns to voice [Vocalizes with extending cooing] : vocalizes with extending cooing [Supports on elbows & wrists in prone] : supports on elbows and wrists in prone [Keeps hands unfisted] : keeps hands unfisted [Grasps objects] : grasps objects [FreeTextEntry1] : JUST STARTED ROLLING BACK TO BELLY

## 2025-04-30 NOTE — DISCUSSION/SUMMARY
[Normal Growth] : growth [Normal Development] : development  [No Elimination Concerns] : elimination [Continue Regimen] : feeding [Normal Sleep Pattern] : sleep [No Skin Concerns] : skin [None] : no medical problems [Add Food/Vitamin] : add ~M [Cereal] : cereal [Fruits] : fruits [Vegetables] : vegetables [Anticipatory Guidance Given] : Anticipatory guidance addressed as per the history of present illness section [Family Functioning] : family functioning [Nutritional Adequacy and Growth] : nutritional adequacy and growth [Infant Development] : infant development [Oral Health] : oral health [Safety] : safety [No Medications] : ~He/She~ is not on any medications [Parent/Guardian] : Parent/Guardian [] : The components of the vaccine(s) to be administered today are listed in the plan of care. The disease(s) for which the vaccine(s) are intended to prevent and the risks have been discussed with the caretaker.  The risks are also included in the appropriate vaccination information statements which have been provided to the patient's caregiver.  The caregiver has given consent to vaccinate. [FreeTextEntry1] :  Cereal may be introduced using a spoon and bowl. When in car, patient should be in rear-facing car seat in back seat. Put baby to sleep on back, in own crib with no loose or soft bedding. Lower crib matress. Help baby to maintain sleep and feeding routines. May offer pacifier if needed. Continue tummy time when awake.  Vaccine(s) given today: PENTACEL, PREVNAR AND ROTA  The potential side effects of today's vaccine(s) and the risks of disease(s) which they are intended to prevent have been discussed with the caretaker.  The caretaker has given consent to vaccinate.

## 2025-04-30 NOTE — PHYSICAL EXAM
[Alert] : alert [Normocephalic] : normocephalic [Flat Open Anterior [place]] : flat open anterior fontanelle [Red Reflex] : red reflex bilateral [PERRL] : PERRL [Normally Placed Ears] : normally placed ears [Auricles Well Formed] : auricles well formed [Clear Tympanic membranes] : clear tympanic membranes [Light reflex present] : light reflex present [Bony landmarks visible] : bony landmarks visible [Nares Patent] : nares patent [Palate Intact] : palate intact [Uvula Midline] : uvula midline [Symmetric Chest Rise] : symmetric chest rise [Clear to Auscultation Bilaterally] : clear to auscultation bilaterally [Regular Rate and Rhythm] : regular rate and rhythm [S1, S2 present] : S1, S2 present [Murmurs] : murmurs [+2 Femoral Pulses] : (+) 2 femoral pulses [Soft] : soft [Bowel Sounds] : bowel sounds present [Normal External Genitalia] : normal external genitalia [Normal Vaginal Introitus] : normal vaginal introitus [Patent] : patent [Normally Placed] : normally placed [No Abnormal Lymph Nodes Palpated] : no abnormal lymph nodes palpated [Plantar Grasp] : plantar grasp reflex present [Startle Reflex] : startle reflex present [Symmetric Serena] : symmetric serena [Acute Distress] : no acute distress [Discharge] : no discharge [Palpable Masses] : no palpable masses [Tender] : nontender [Distended] : nondistended [Hepatomegaly] : no hepatomegaly [Splenomegaly] : no splenomegaly [Clitoromegaly] : no clitoromegaly [Hurtado-Ortolani] : negative Hurtado-Ortolani [Allis Sign] : negative Allis sign [Spinal Dimple] : no spinal dimple [Tuft of Hair] : no tuft of hair [Rash or Lesions] : no rash/lesions

## 2025-04-30 NOTE — HISTORY OF PRESENT ILLNESS
[Mother] : mother [Father] : father [Breast milk] : breast milk [Formula ___ oz/feed] : [unfilled] oz of formula per feed [Hours between feeds ___] : Child is fed every [unfilled] hours [Normal] : Normal [In Bassinet/Crib] : sleeps in bassinet/crib [On back] : sleeps on back [Pacifier use] : Pacifier use [Tummy time] : tummy time [No] : No cigarette smoke exposure [Rear facing car seat in back seat] : Rear facing car seat in back seat [Carbon Monoxide Detectors] : Carbon monoxide detectors at home [Smoke Detectors] : Smoke detectors at home. [Co-sleeping] : no co-sleeping [Exposure to electronic nicotine delivery system] : No exposure to electronic nicotine delivery system [de-identified] : ENFAMIL AR, MOSTLY FORMULA, SOME BREAST [FreeTextEntry3] : 3HRS CONSECUTIVELY AT NIGHT, WAKES TO FEED [de-identified] : OCCASIONAL

## 2025-04-30 NOTE — PLAN
[TextEntry] : CARDIO F/U IN SEPT RECHECK HC IN 1 MONTH (RECENT  GROWTH RE-ACCELERATION) WELL VISIT IN 2 MONTHS

## 2025-04-30 NOTE — HISTORY OF PRESENT ILLNESS
[Mother] : mother [Father] : father [Breast milk] : breast milk [Formula ___ oz/feed] : [unfilled] oz of formula per feed [Hours between feeds ___] : Child is fed every [unfilled] hours [Normal] : Normal [In Bassinet/Crib] : sleeps in bassinet/crib [On back] : sleeps on back [Pacifier use] : Pacifier use [Tummy time] : tummy time [No] : No cigarette smoke exposure [Rear facing car seat in back seat] : Rear facing car seat in back seat [Carbon Monoxide Detectors] : Carbon monoxide detectors at home [Smoke Detectors] : Smoke detectors at home. [Co-sleeping] : no co-sleeping [Exposure to electronic nicotine delivery system] : No exposure to electronic nicotine delivery system [de-identified] : ENFAMIL AR, MOSTLY FORMULA, SOME BREAST [FreeTextEntry3] : 3HRS CONSECUTIVELY AT NIGHT, WAKES TO FEED [de-identified] : OCCASIONAL

## 2025-04-30 NOTE — DISCUSSION/SUMMARY
[Normal Growth] : growth [Normal Development] : development  [No Elimination Concerns] : elimination [Continue Regimen] : feeding [No Skin Concerns] : skin [Normal Sleep Pattern] : sleep [None] : no medical problems [Add Food/Vitamin] : add ~M [Cereal] : cereal [Fruits] : fruits [Vegetables] : vegetables [Anticipatory Guidance Given] : Anticipatory guidance addressed as per the history of present illness section [Family Functioning] : family functioning [Nutritional Adequacy and Growth] : nutritional adequacy and growth [Infant Development] : infant development [Oral Health] : oral health [Safety] : safety [No Medications] : ~He/She~ is not on any medications [Parent/Guardian] : Parent/Guardian [] : The components of the vaccine(s) to be administered today are listed in the plan of care. The disease(s) for which the vaccine(s) are intended to prevent and the risks have been discussed with the caretaker.  The risks are also included in the appropriate vaccination information statements which have been provided to the patient's caregiver.  The caregiver has given consent to vaccinate. [FreeTextEntry1] :  Cereal may be introduced using a spoon and bowl. When in car, patient should be in rear-facing car seat in back seat. Put baby to sleep on back, in own crib with no loose or soft bedding. Lower crib matress. Help baby to maintain sleep and feeding routines. May offer pacifier if needed. Continue tummy time when awake.  Vaccine(s) given today: PENTACEL, PREVNAR AND ROTA  The potential side effects of today's vaccine(s) and the risks of disease(s) which they are intended to prevent have been discussed with the caretaker.  The caretaker has given consent to vaccinate.

## 2025-06-11 NOTE — QUALITY MEASURES
[Safety and education around seizures] : Safety and education around seizures: Yes [Etiology, seizure type, and epilepsy syndrome] : Etiology, seizure type, and epilepsy syndrome: Yes [Seizure frequency] : Seizure frequency: Not Applicable [Side effects of anti-seizure medications] : Side effects of anti-seizure medications: Not Applicable [Sudden unexpected death in epilepsy (SUDEP)] : Sudden unexpected death in epilepsy: Not Applicable [Issues around driving] : Issues around driving: Not Applicable [Screening for anxiety, depression] : Screening for anxiety, depression: Not Applicable [Treatment-resistant epilepsy (every visit)] : Treatment-resistant epilepsy (every visit): Not Applicable [Adherence to medication(s)] : Adherence to medication(s): Not Applicable [Counseling for women of childbearing potential with epilepsy (including folic acid supplement)] : Counseling for women of childbearing potential with epilepsy (including folic acid supplement): Not Applicable [Options for adjunctive therapy (Neurostimulation, CBD, Dietary Therapy, Epilepsy Surgery)] : Options for adjunctive therapy (Neurostimulation, CBD, Dietary Therapy, Epilepsy Surgery): Not Applicable [25 Hydroxy Vitamin D level assessed and Vitamin D3 ordered] : 25 Hydroxy Vitamin D level assessed and Vitamin D3 ordered: Not Applicable [Thyroid profile ordered] : Thyroid profile ordered: Not Applicable

## 2025-06-11 NOTE — CONSULT LETTER
[Dear  ___] : Dear  [unfilled], [Please see my note below.] : Please see my note below. [FreeTextEntry3] : Linda Malone,  Attending Child Neurologist/Epileptologist

## 2025-06-11 NOTE — DEVELOPMENTAL MILESTONES
[Work for toy] : work for toy [Regards own hand] : regards own hand [Responds to affection] : responds to affection [Can calm down on own] : can calm down on own [Social smile] : social smile [Follow 180 degrees] : follow 180 degrees [Puts hands together] : puts hands together [Grasps object] : grasps object [Imitate speech sounds] : imitate speech sounds [Turns to rattling sound] : turns to rattling sound [Squeals] : squeals  [Chest up - arm support] : chest up - arm support [Bears weight on legs] : bears weight on legs  [Uses verbal exploration] : uses verbal exploration [Uses oral exploration] : uses oral exploration [Beginning to recognize own name] : beginning to recognize own name [Enjoys vocal turn taking] : enjoys vocal turn taking [Shows pleasure from interactions with others] : shows pleasure from interactions with others [Passes objects] : passes objects [Rakes objects] : rakes objects [Saw] : saw [Imitate speech/sounds] : imitate speech/sounds [Single syllables (ah,eh,oh)] : single syllables (ah,eh,oh) [Turns to voices] : turns to voices [Spontaneous Excessive Babbling] : spontaneous excessive babbling [Sit - no support, leaning forward] : sit - no support, leaning forward [Pulls to sit - no head lag] : pulls to sit - no head lag [Roll over] : roll over [Feeds self] : does not feed self [Combines syllables] : does not combine syllables [FreeTextEntry3] : trying to hold bottle [Elias/Mama non-specific] : not elias/mama specific

## 2025-06-11 NOTE — ASSESSMENT
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 Discussed that macrocephaly can be due to enlargement of cranium (as in hyperostosis crani or due to secondary cause of bone marrow expansion as in thalassemia major) or increased intracranial pressure from an infection or space-occupying lesion such as tumor or bleed. It can also be due to increase volume of intracranial structures such as cerebrospinal fluid (hydrocephalus) or brain parenchyma as seen in megalencephaly. Megalencephaly is usually associated with syndromes such as leukodystrophies, lysosomal storage, and neurocutaneous disorders as such as Sturge-Carias syndrome. Patient does not have any port-wine stains or facial angiofibromas findings that is suggestive of a syndrome. 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 There is a favorable prognosis for children with benign familial macrocephaly. Most children with benign familial macrocephaly remain asymptomatic and do not need treatment except for regular monitoring of head size, physical growth, and neurologic development. Discussed red flags, expected developmental milestones, and possible Ophthalmologist evaluation. Extensive counseling on seizure signs/symptoms, seizure types, seizure precaution, abnormal eye movements, emesis and written materials provided on seizure safety, SUDEP. Will consider repeating neuroimaging with MRI brain around 1-2 years old to ensure resolution of JEREMY, sooner if any clinical concerns. Follow up with PCP regarding spit-up/reflex, milestones, and HC monitoring and Cardiology for VSD, PPS, PFO, murmur. Follow up in 1-2 months, or sooner if symptoms worsen.  I spent a total of 75 minutes on the date of the encounter chart reviewing, evaluating, coordination of care, counseling, and treating the patient.

## 2025-06-11 NOTE — PLAN
[FreeTextEntry1] : - Educated on red flags and return precautions - Educated on seizure safety - Considered skull radiography and head CT or brain MRI if he continues to be macrocephalic and develops features of ICP pressure or neurodevelopmental abnormalities. - Counseled father to continue tracking head circumference through pediatrician and to watch for signs of increased ICP. - Continue to follow with Cardiology - Continue to follow with PCP for spit-up/reflex, milestones tracking, and HC monitoring - Follow up in 1-2 months.

## 2025-06-11 NOTE — REVIEW OF SYSTEMS
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

## 2025-06-11 NOTE — REASON FOR VISIT
[Initial Consultation] : an initial consultation for [Other: ____] : [unfilled] [Medical Records] : medical records [Family Member] : family member [Father] : father [Other: _____] : [unfilled] [Patient Declined  Services] : - None: Patient declined  services

## 2025-06-11 NOTE — HISTORY OF PRESENT ILLNESS
[FreeTextEntry1] : Yuliana is a 5 month old, ex-FT, female with VSD, PPS, PFO, murmur followed by Cardiology who presents to Neurology for their initial visit with concerns of macrocephaly. Accompanied by father and paternal cousin today.  Father reports she was born full-term via  and stayed in the hospital nursey for three days post-delivery. Per father, the patient's head size was initially normal at birth (unsure of exact number or percentiles) but has been noted to be growing rapidly by her pediatrician but reportedly patient is meeting developmental milestones appropriately for her age. Patient had a head ultrasound was performed in 2025, which showed enlargement of subarachnoid spaces. Father admits to having a big head and difficulty finding hats/caps. Father denies any symptoms of concern, emesis, abnormal eye movements, episodes of unresponsiveness, tongue biting, foaming at the mouth, apnea, cyanosis, choking, head drops, arm flexion, stiffening, loss of tone, facial twitching, eyelid fluttering, AMS, post-ictal Frankie's, lack of sleep, stress, recent sickness, new medications, supplements, any developmental regression/stagnation, previous meningitis, head trauma/concussion, neurosurgical procedure, autism, developmental delays, staring episodes, myoclonus, convulsions, waking up with blood on pillow, unexplained myalgias, febrile seizures, history of previous post-ictal Frankie's, status epilepticus or seizure clusters, family history of seizures, autism, or developmental delays.   Patient sleeps in the same room as parents.   Father reports Cardiology states heart problems are improving with follow up in 6 months. Just started solids per paternal cousin. Patient has some spit-up but denies back arching, fever, chill, URI symptoms, emesis, diarrhea, rash, sick contacts.   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 No hydrocephalus. PCP Note (25): FATHER'S HC TODAY: 59.5 cm

## 2025-06-11 NOTE — PHYSICAL EXAM
[Well-appearing] : well-appearing [Anterior fontanel- Flat] : anterior fontanel- flat [No dysmorphic facial features] : no dysmorphic facial features [No ocular abnormalities] : no ocular abnormalities [Lungs clear] : lungs clear [Soft] : soft [Straight] : straight [No dimitris or dimples] : no dimitris or dimples [No deformities] : no deformities [Alert] : alert [Regards] : regards [Smiling] : smiling [Pupils reactive to light] : pupils reactive to light [Turns to light] : turns to light [Tracks face, light or objects with full extraocular movements] : tracks face, light or objects with full extraocular movements [No facial asymmetry or weakness] : no facial asymmetry or weakness [No nystagmus] : no nystagmus [Responds to voice/sounds] : responds to voice/sounds [Midline tongue] : midline tongue [No fasciculations] : no fasciculations [Normal axial and appendicular muscle tone with symmetric limb movements] : normal axial and appendicular muscle tone with symmetric limb movements [Normal bulk] : normal bulk [Good  bilaterally] : good  bilaterally [Lift head in prone] : lift head in prone [No abnormal involuntary movements] : no abnormal involuntary movements [2+ biceps] : 2+ biceps [Knee jerks] : knee jerks [Ankle jerks] : ankle jerks [No ankle clonus] : no ankle clonus [Responds to touch and tickle] : responds to touch and tickle [No dysmetria in reaching for objects] : no dysmetria in reaching for objects [de-identified] : crying vigorously and screaming loudly, consolable [de-identified] : macrocephalic [de-identified] : heart sounds regular rate [de-identified] : 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

## 2025-06-26 NOTE — REVIEW OF SYSTEMS
esophageal forgein body  dysphagia  abdominal pain  abnormal ct      npo  cont present medications  plan for urgent  upper gastrointestinal endoscopy today with general anesthesia  d/w patient  d/w patients family at bedside  d/w primary team
denies
[Negative] : Genitourinary
[Negative] : Genitourinary

## 2025-06-27 NOTE — DISCUSSION/SUMMARY
[Normal Growth] : growth [Normal Development] : development [None] : No medical problems [No Elimination Concerns] : elimination [No Feeding Concerns] : feeding [No Skin Concerns] : skin [Normal Sleep Pattern] : sleep [Add Food/Vitamin] : Add Food/Vitamin: [Protein Foods] : protein foods [Water] : water [Family Functioning] : family functioning [Nutrition and Feeding] : nutrition and feeding [Infant Development] : infant development [Oral Health] : oral health [Safety] : safety [No Medications] : ~He/She~ is not on any medications [Parent/Guardian] : parent/guardian [de-identified] : MAY TRIAL OFF ENFAMIL AR [] : The components of the vaccine(s) to be administered today are listed in the plan of care. The disease(s) for which the vaccine(s) are intended to prevent and the risks have been discussed with the caretaker.  The risks are also included in the appropriate vaccination information statements which have been provided to the patient's caregiver.  The caregiver has given consent to vaccinate. [FreeTextEntry1] :  Introduce single-ingredient foods rich in iron, one at a time. Incorporate up to 4 oz of flourinated water daily in a sippy cup. When teeth erupt wipe daily with washcloth. When in car, patient should be in rear-facing car seat in back seat. Put baby to sleep on back, in own crib with no loose or soft bedding. Lower crib matress. Help baby to maintain sleep and feeding routines. May offer pacifier if needed. Continue tummy time when awake. Ensure home is safe since baby is now more mobile. Do not use infant walker. Read aloud to baby.  Vaccine(s) given today: PENTACEL, PREVNAR AND ROTA  The potential side effects of today's vaccine(s) and the risks of disease(s) which they are intended to prevent have been discussed with the caretaker.  The caretaker has given consent to vaccinate.

## 2025-06-27 NOTE — HISTORY OF PRESENT ILLNESS
[Parents] : parents [Breast milk] : breast milk [Formula ___ oz/feed] : [unfilled] oz of formula per feed [___ Feeding per 24 hrs] : a  total of [unfilled] feedings in 24 hours [Fruits] : fruits [Vegetables] : vegetables [Normal] : Normal [In Bassinet/Crib] : sleeps in bassinet/crib [On back] : sleeps on back [Tummy time] : tummy time [No] : No cigarette smoke exposure [Rear facing car seat in back seat] : Rear facing car seat in back seat [Carbon Monoxide Detectors] : Carbon monoxide detectors at home [Smoke Detectors] : Smoke detectors at home. [Co-sleeping] : no co-sleeping [Sleeps 12-16 hours per 24 hours (including naps)] : Does not sleep 12-16 hours per 24 hours (including naps) [Loose bedding, pillow, toys, and/or bumpers in crib] : no loose bedding, pillow, toys, and/or bumpers in crib [Pacifier use] : not using pacifier [Exposure to electronic nicotine delivery system] : No exposure to electronic nicotine delivery system [de-identified] : Enfamil [employer] [place], 2 MEALS/DAY [de-identified] : through the night + 3 naps [de-identified] : Home

## 2025-06-27 NOTE — DEVELOPMENTAL MILESTONES
[Normal Development] : Normal Development [Begins to turn when name called] : begins to turn when name called [Rolls over prone to supine] : rolls over prone to supine [Sits briefly without support] : sits briefly without support [Reaches for object and transfers] : reaches for object and transfers [Babbles] : does not babble [FreeTextEntry1] : PUSHES UP ON HANDS WHEN PRONE LAUGHS

## 2025-06-27 NOTE — PLAN
[TextEntry] : F/U WITH NEURO IN AUGUST AND CARDIO IN SEPT INFORMED NEURO RE: NEW KANG, WILL MONITOR WELL VISIT IN 3 MONTHS

## 2025-06-27 NOTE — DISCUSSION/SUMMARY
[Normal Growth] : growth [Normal Development] : development [None] : No medical problems [No Elimination Concerns] : elimination [No Feeding Concerns] : feeding [No Skin Concerns] : skin [Normal Sleep Pattern] : sleep [Add Food/Vitamin] : Add Food/Vitamin: [Protein Foods] : protein foods [Water] : water [Family Functioning] : family functioning [Nutrition and Feeding] : nutrition and feeding [Infant Development] : infant development [Oral Health] : oral health [Safety] : safety [No Medications] : ~He/She~ is not on any medications [Parent/Guardian] : parent/guardian [de-identified] : MAY TRIAL OFF ENFAMIL AR [] : The components of the vaccine(s) to be administered today are listed in the plan of care. The disease(s) for which the vaccine(s) are intended to prevent and the risks have been discussed with the caretaker.  The risks are also included in the appropriate vaccination information statements which have been provided to the patient's caregiver.  The caregiver has given consent to vaccinate. [FreeTextEntry1] :  Introduce single-ingredient foods rich in iron, one at a time. Incorporate up to 4 oz of flourinated water daily in a sippy cup. When teeth erupt wipe daily with washcloth. When in car, patient should be in rear-facing car seat in back seat. Put baby to sleep on back, in own crib with no loose or soft bedding. Lower crib matress. Help baby to maintain sleep and feeding routines. May offer pacifier if needed. Continue tummy time when awake. Ensure home is safe since baby is now more mobile. Do not use infant walker. Read aloud to baby.  Vaccine(s) given today: PENTACEL, PREVNAR AND ROTA  The potential side effects of today's vaccine(s) and the risks of disease(s) which they are intended to prevent have been discussed with the caretaker.  The caretaker has given consent to vaccinate.

## 2025-06-27 NOTE — PHYSICAL EXAM
[Alert] : alert [Acute Distress] : no acute distress [Normocephalic] : normocephalic [Flat Open Anterior [place]] : flat open anterior fontanelle [Red Reflex] : red reflex bilateral [PERRL] : PERRL [Normally Placed Ears] : normally placed ears [Auricles Well Formed] : auricles well formed [Clear Tympanic membranes] : clear tympanic membranes [Light reflex present] : light reflex present [Bony landmarks visible] : bony landmarks visible [Discharge] : no discharge [Nares Patent] : nares patent [Palate Intact] : palate intact [Uvula Midline] : uvula midline [Tooth Eruption] : no tooth eruption [Supple, full passive range of motion] : supple, full passive range of motion [Palpable Masses] : no palpable masses [Symmetric Chest Rise] : symmetric chest rise [Clear to Auscultation Bilaterally] : clear to auscultation bilaterally [Regular Rate and Rhythm] : regular rate and rhythm [S1, S2 present] : S1, S2 present [Murmurs] : murmurs [+2 Femoral Pulses] : (+) 2 femoral pulses [Soft] : soft [Tender] : nontender [Distended] : nondistended [Bowel Sounds] : bowel sounds present [Hepatomegaly] : no hepatomegaly [Splenomegaly] : no splenomegaly [Normal External Genitalia] : normal external genitalia [Clitoromegaly] : no clitoromegaly [Normal Vaginal Introitus] : normal vaginal introitus [Patent] : patent [Normally Placed] : normally placed [No Abnormal Lymph Nodes Palpated] : no abnormal lymph nodes palpated [Hurtado-Ortolani] : negative Hurtado-Ortolani [Allis Sign] : negative Allis sign [Symmetric Buttocks Creases] : symmetric buttocks creases [Spinal Dimple] : no spinal dimple [Tuft of Hair] : no tuft of hair [Plantar Grasp] : plantar grasp reflex present [Cranial Nerves Grossly Intact] : cranial nerves grossly intact [Rash or Lesions] : no rash/lesions [de-identified] : MACROCEPHALY [de-identified] : 3 LIGHT CAFE AU LAIT (1 ON MID ABDOMEN, 2 ON BACK) ~4-5MM

## 2025-06-27 NOTE — PHYSICAL EXAM
[Alert] : alert [Acute Distress] : no acute distress [Normocephalic] : normocephalic [Flat Open Anterior [place]] : flat open anterior fontanelle [Red Reflex] : red reflex bilateral [PERRL] : PERRL [Normally Placed Ears] : normally placed ears [Auricles Well Formed] : auricles well formed [Clear Tympanic membranes] : clear tympanic membranes [Light reflex present] : light reflex present [Bony landmarks visible] : bony landmarks visible [Discharge] : no discharge [Nares Patent] : nares patent [Palate Intact] : palate intact [Uvula Midline] : uvula midline [Tooth Eruption] : no tooth eruption [Supple, full passive range of motion] : supple, full passive range of motion [Palpable Masses] : no palpable masses [Symmetric Chest Rise] : symmetric chest rise [Clear to Auscultation Bilaterally] : clear to auscultation bilaterally [Regular Rate and Rhythm] : regular rate and rhythm [S1, S2 present] : S1, S2 present [Murmurs] : murmurs [+2 Femoral Pulses] : (+) 2 femoral pulses [Soft] : soft [Tender] : nontender [Distended] : nondistended [Bowel Sounds] : bowel sounds present [Hepatomegaly] : no hepatomegaly [Splenomegaly] : no splenomegaly [Normal External Genitalia] : normal external genitalia [Clitoromegaly] : no clitoromegaly [Normal Vaginal Introitus] : normal vaginal introitus [Patent] : patent [Normally Placed] : normally placed [No Abnormal Lymph Nodes Palpated] : no abnormal lymph nodes palpated [Hurtado-Ortolani] : negative Hurtado-Ortolani [Allis Sign] : negative Allis sign [Symmetric Buttocks Creases] : symmetric buttocks creases [Spinal Dimple] : no spinal dimple [Tuft of Hair] : no tuft of hair [Plantar Grasp] : plantar grasp reflex present [Cranial Nerves Grossly Intact] : cranial nerves grossly intact [Rash or Lesions] : no rash/lesions [de-identified] : MACROCEPHALY [de-identified] : 3 LIGHT CAFE AU LAIT (1 ON MID ABDOMEN, 2 ON BACK) ~4-5MM

## 2025-06-27 NOTE — HISTORY OF PRESENT ILLNESS
[Parents] : parents [Breast milk] : breast milk [Formula ___ oz/feed] : [unfilled] oz of formula per feed [___ Feeding per 24 hrs] : a  total of [unfilled] feedings in 24 hours [Fruits] : fruits [Vegetables] : vegetables [Normal] : Normal [In Bassinet/Crib] : sleeps in bassinet/crib [On back] : sleeps on back [Tummy time] : tummy time [No] : No cigarette smoke exposure [Rear facing car seat in back seat] : Rear facing car seat in back seat [Carbon Monoxide Detectors] : Carbon monoxide detectors at home [Smoke Detectors] : Smoke detectors at home. [Co-sleeping] : no co-sleeping [Sleeps 12-16 hours per 24 hours (including naps)] : Does not sleep 12-16 hours per 24 hours (including naps) [Loose bedding, pillow, toys, and/or bumpers in crib] : no loose bedding, pillow, toys, and/or bumpers in crib [Pacifier use] : not using pacifier [Exposure to electronic nicotine delivery system] : No exposure to electronic nicotine delivery system [de-identified] : Enf[employer] [place], 2 MEALS/DAY [de-identified] : through the night + 3 naps [de-identified] : Home